# Patient Record
Sex: FEMALE | Race: WHITE | URBAN - METROPOLITAN AREA
[De-identification: names, ages, dates, MRNs, and addresses within clinical notes are randomized per-mention and may not be internally consistent; named-entity substitution may affect disease eponyms.]

---

## 2021-11-09 ENCOUNTER — OFFICE VISIT (OUTPATIENT)
Dept: URBAN - METROPOLITAN AREA CLINIC 98 | Facility: CLINIC | Age: 81
End: 2021-11-09
Payer: MEDICARE

## 2021-11-09 VITALS
TEMPERATURE: 97.3 F | WEIGHT: 197.8 LBS | DIASTOLIC BLOOD PRESSURE: 73 MMHG | HEIGHT: 67 IN | HEART RATE: 63 BPM | SYSTOLIC BLOOD PRESSURE: 126 MMHG | BODY MASS INDEX: 31.04 KG/M2

## 2021-11-09 DIAGNOSIS — Z79.01 ANTICOAGULANT LONG-TERM USE: ICD-10-CM

## 2021-11-09 DIAGNOSIS — Z12.11 COLON CANCER SCREENING: ICD-10-CM

## 2021-11-09 DIAGNOSIS — D12.6 ADENOMATOUS POLYP OF COLON, UNSPECIFIED PART OF COLON: ICD-10-CM

## 2021-11-09 PROCEDURE — G8427 DOCREV CUR MEDS BY ELIG CLIN: HCPCS | Performed by: INTERNAL MEDICINE

## 2021-11-09 PROCEDURE — 3017F COLORECTAL CA SCREEN DOC REV: CPT | Performed by: INTERNAL MEDICINE

## 2021-11-09 PROCEDURE — 99203 OFFICE O/P NEW LOW 30 MIN: CPT | Performed by: INTERNAL MEDICINE

## 2021-11-09 PROCEDURE — G9903 PT SCRN TBCO ID AS NON USER: HCPCS | Performed by: INTERNAL MEDICINE

## 2021-11-09 PROCEDURE — G8417 CALC BMI ABV UP PARAM F/U: HCPCS | Performed by: INTERNAL MEDICINE

## 2021-11-09 PROCEDURE — G8482 FLU IMMUNIZE ORDER/ADMIN: HCPCS | Performed by: INTERNAL MEDICINE

## 2021-11-09 RX ORDER — FLUOXETINE HYDROCHLORIDE 40 MG/1
CAPSULE ORAL
Qty: 0 | Refills: 0 | Status: ACTIVE | COMMUNITY
Start: 1900-01-01

## 2021-11-09 RX ORDER — FLECAINIDE ACETATE 50 MG/1
AS DIRECTED TABLET ORAL
Status: ACTIVE | COMMUNITY

## 2021-11-09 RX ORDER — METOPROLOL SUCCINATE 25 MG/1
1 TABLET TABLET, FILM COATED, EXTENDED RELEASE ORAL ONCE A DAY
Status: ACTIVE | COMMUNITY

## 2021-11-09 RX ORDER — SIMVASTATIN 40 MG/1
TABLET, FILM COATED ORAL
Qty: 0 | Refills: 0 | Status: ACTIVE | COMMUNITY
Start: 1900-01-01

## 2021-11-09 RX ORDER — METFORMIN HYDROCHLORIDE 850 MG/1
TABLET, COATED ORAL
Qty: 0 | Refills: 0 | Status: ON HOLD | COMMUNITY
Start: 1900-01-01

## 2021-11-09 NOTE — HPI-TODAY'S VISIT:
PMH A. Fib (on Coumadin), HLD, and depression, TA seen 2015 Here to discuss colonoscopy Last colonoscopy 2018 without adenomas. Colonoscopy 2015 with small TA removed  SSA removed 2013 HPs seen 2011 She has no change in bowel habits No blood in stool Does have FHx CRC in mother at age 53

## 2022-08-23 ENCOUNTER — LAB OUTSIDE AN ENCOUNTER (OUTPATIENT)
Dept: URBAN - METROPOLITAN AREA CLINIC 98 | Facility: CLINIC | Age: 82
End: 2022-08-23

## 2022-08-23 ENCOUNTER — OFFICE VISIT (OUTPATIENT)
Dept: URBAN - METROPOLITAN AREA CLINIC 98 | Facility: CLINIC | Age: 82
End: 2022-08-23
Payer: MEDICARE

## 2022-08-23 ENCOUNTER — WEB ENCOUNTER (OUTPATIENT)
Dept: URBAN - METROPOLITAN AREA CLINIC 98 | Facility: CLINIC | Age: 82
End: 2022-08-23

## 2022-08-23 VITALS
TEMPERATURE: 97.1 F | HEART RATE: 72 BPM | BODY MASS INDEX: 29.7 KG/M2 | DIASTOLIC BLOOD PRESSURE: 80 MMHG | SYSTOLIC BLOOD PRESSURE: 139 MMHG | HEIGHT: 67 IN | WEIGHT: 189.2 LBS

## 2022-08-23 DIAGNOSIS — D12.6 ADENOMATOUS POLYP OF COLON, UNSPECIFIED PART OF COLON: ICD-10-CM

## 2022-08-23 DIAGNOSIS — Z12.11 COLON CANCER SCREENING: ICD-10-CM

## 2022-08-23 DIAGNOSIS — R19.4 CHANGE IN BOWEL HABITS: ICD-10-CM

## 2022-08-23 DIAGNOSIS — Z79.01 ANTICOAGULANT LONG-TERM USE: ICD-10-CM

## 2022-08-23 PROCEDURE — G8427 DOCREV CUR MEDS BY ELIG CLIN: HCPCS | Performed by: INTERNAL MEDICINE

## 2022-08-23 PROCEDURE — G8482 FLU IMMUNIZE ORDER/ADMIN: HCPCS | Performed by: INTERNAL MEDICINE

## 2022-08-23 PROCEDURE — 99214 OFFICE O/P EST MOD 30 MIN: CPT | Performed by: INTERNAL MEDICINE

## 2022-08-23 PROCEDURE — G8420 CALC BMI NORM PARAMETERS: HCPCS | Performed by: INTERNAL MEDICINE

## 2022-08-23 PROCEDURE — G9903 PT SCRN TBCO ID AS NON USER: HCPCS | Performed by: INTERNAL MEDICINE

## 2022-08-23 PROCEDURE — 3017F COLORECTAL CA SCREEN DOC REV: CPT | Performed by: INTERNAL MEDICINE

## 2022-08-23 RX ORDER — METFORMIN HYDROCHLORIDE 850 MG/1
TABLET, COATED ORAL
Qty: 0 | Refills: 0 | Status: ON HOLD | COMMUNITY
Start: 1900-01-01

## 2022-08-23 RX ORDER — METOPROLOL SUCCINATE 25 MG/1
1 TABLET TABLET, FILM COATED, EXTENDED RELEASE ORAL ONCE A DAY
Status: ACTIVE | COMMUNITY

## 2022-08-23 RX ORDER — SIMVASTATIN 40 MG/1
TABLET, FILM COATED ORAL
Qty: 0 | Refills: 0 | Status: ACTIVE | COMMUNITY
Start: 1900-01-01

## 2022-08-23 RX ORDER — FLECAINIDE ACETATE 50 MG/1
AS DIRECTED TABLET ORAL
Status: ACTIVE | COMMUNITY

## 2022-08-23 RX ORDER — POLYETHYLENE GLYCOL-3350 AND ELECTROLYTES WITH FLAVOR PACK 240; 5.84; 2.98; 6.72; 22.72 G/278.26G; G/278.26G; G/278.26G; G/278.26G; G/278.26G
AS DIRECTED POWDER, FOR SOLUTION ORAL ONCE
Qty: 1 KIT | Refills: 0 | OUTPATIENT
Start: 2022-08-23 | End: 2022-08-24

## 2022-08-23 NOTE — HPI-TODAY'S VISIT:
f/u visit Over past 3mo she has had a change in bowel habits She will have a bowel movement, which is followed by diarrhea This occurs almost every day, 3x/day No blood or mucus in stools No abx use in the past year No sick contacts She did start Celexa about 3mo ago (thinks diarrhea began before starting Celexa) +ve tenesmus   . PRIOR VISIT: PMH A. Fib (on Coumadin), HLD, and depression, TA seen 2015 Here to discuss colonoscopy Last colonoscopy 2018 without adenomas. Colonoscopy 2015 with small TA removed  SSA removed 2013 HPs seen 2011 She has no change in bowel habits No blood in stool Does have FHx CRC in mother at age 53

## 2022-08-24 LAB
A/G RATIO: 1.6
ABSOLUTE BASOPHILS: 42
ABSOLUTE EOSINOPHILS: 231
ABSOLUTE LYMPHOCYTES: 2226
ABSOLUTE MONOCYTES: 623
ABSOLUTE NEUTROPHILS: 3878
ALBUMIN: 4.1
ALKALINE PHOSPHATASE: 69
ALT (SGPT): 22
AST (SGOT): 25
BASOPHILS: 0.6
BILIRUBIN, TOTAL: 0.5
BUN/CREATININE RATIO: (no result)
BUN: 19
C-REACTIVE PROTEIN, QUANT: 4.8
CALCIUM: 9.6
CARBON DIOXIDE, TOTAL: 26
CHLORIDE: 99
CREATININE: 0.8
EGFR: 74
EOSINOPHILS: 3.3
GLOBULIN, TOTAL: 2.6
GLUCOSE: 70
HEMATOCRIT: 36.8
HEMOGLOBIN: 11.9
LYMPHOCYTES: 31.8
MCH: 28.5
MCHC: 32.3
MCV: 88
MONOCYTES: 8.9
MPV: 10.8
NEUTROPHILS: 55.4
PLATELET COUNT: 199
POTASSIUM: 4.3
PROTEIN, TOTAL: 6.7
RDW: 16.4
RED BLOOD CELL COUNT: 4.18
SODIUM: 136
TSH W/REFLEX TO FT4: 2.99
WHITE BLOOD CELL COUNT: 7

## 2022-10-12 ENCOUNTER — TELEPHONE ENCOUNTER (OUTPATIENT)
Dept: URBAN - METROPOLITAN AREA CLINIC 98 | Facility: CLINIC | Age: 82
End: 2022-10-12

## 2022-10-14 ENCOUNTER — OFFICE VISIT (OUTPATIENT)
Dept: URBAN - METROPOLITAN AREA MEDICAL CENTER 28 | Facility: MEDICAL CENTER | Age: 82
End: 2022-10-14

## 2022-10-14 RX ORDER — FLECAINIDE ACETATE 50 MG/1
AS DIRECTED TABLET ORAL
Status: ACTIVE | COMMUNITY

## 2022-10-14 RX ORDER — METFORMIN HYDROCHLORIDE 850 MG/1
TABLET, COATED ORAL
Qty: 0 | Refills: 0 | Status: ON HOLD | COMMUNITY
Start: 1900-01-01

## 2022-10-14 RX ORDER — SIMVASTATIN 40 MG/1
TABLET, FILM COATED ORAL
Qty: 0 | Refills: 0 | Status: ACTIVE | COMMUNITY
Start: 1900-01-01

## 2022-10-14 RX ORDER — METOPROLOL SUCCINATE 25 MG/1
1 TABLET TABLET, FILM COATED, EXTENDED RELEASE ORAL ONCE A DAY
Status: ACTIVE | COMMUNITY

## 2023-02-21 ENCOUNTER — OFFICE VISIT (OUTPATIENT)
Dept: URBAN - METROPOLITAN AREA CLINIC 98 | Facility: CLINIC | Age: 83
End: 2023-02-21
Payer: MEDICARE

## 2023-02-21 ENCOUNTER — LAB OUTSIDE AN ENCOUNTER (OUTPATIENT)
Dept: URBAN - METROPOLITAN AREA CLINIC 98 | Facility: CLINIC | Age: 83
End: 2023-02-21

## 2023-02-21 ENCOUNTER — DASHBOARD ENCOUNTERS (OUTPATIENT)
Age: 83
End: 2023-02-21

## 2023-02-21 VITALS
HEART RATE: 69 BPM | DIASTOLIC BLOOD PRESSURE: 73 MMHG | WEIGHT: 196.5 LBS | SYSTOLIC BLOOD PRESSURE: 136 MMHG | BODY MASS INDEX: 30.84 KG/M2 | TEMPERATURE: 97.3 F | HEIGHT: 67 IN

## 2023-02-21 DIAGNOSIS — R19.4 CHANGE IN BOWEL HABITS: ICD-10-CM

## 2023-02-21 DIAGNOSIS — Z12.11 COLON CANCER SCREENING: ICD-10-CM

## 2023-02-21 DIAGNOSIS — D12.6 ADENOMATOUS POLYP OF COLON, UNSPECIFIED PART OF COLON: ICD-10-CM

## 2023-02-21 DIAGNOSIS — Z79.01 ANTICOAGULANT LONG-TERM USE: ICD-10-CM

## 2023-02-21 DIAGNOSIS — Z86.010 PERSONAL HISTORY OF COLONIC POLYPS: ICD-10-CM

## 2023-02-21 PROBLEM — 129851009: Status: ACTIVE | Noted: 2022-08-23

## 2023-02-21 PROBLEM — 711150003 LONG-TERM CURRENT USE OF ANTICOAGULANT: Status: ACTIVE | Noted: 2021-11-09

## 2023-02-21 PROBLEM — 428283002: Status: ACTIVE | Noted: 2023-02-21

## 2023-02-21 PROBLEM — 92065004 BENIGN NEOPLASM OF COLON: Status: ACTIVE | Noted: 2021-11-09

## 2023-02-21 PROCEDURE — 99214 OFFICE O/P EST MOD 30 MIN: CPT | Performed by: INTERNAL MEDICINE

## 2023-02-21 RX ORDER — FLECAINIDE ACETATE 50 MG/1
AS DIRECTED TABLET ORAL
Status: ACTIVE | COMMUNITY

## 2023-02-21 RX ORDER — SODIUM, POTASSIUM,MAG SULFATES 17.5-3.13G
354ML SOLUTION, RECONSTITUTED, ORAL ORAL ONCE
Qty: 354 MILLILITER | Refills: 0 | OUTPATIENT

## 2023-02-21 RX ORDER — METOPROLOL SUCCINATE 25 MG/1
1 TABLET TABLET, FILM COATED, EXTENDED RELEASE ORAL ONCE A DAY
Status: ACTIVE | COMMUNITY

## 2023-02-21 RX ORDER — SIMVASTATIN 40 MG/1
TABLET, FILM COATED ORAL
Qty: 0 | Refills: 0 | Status: ACTIVE | COMMUNITY
Start: 1900-01-01

## 2023-02-21 RX ORDER — METFORMIN HYDROCHLORIDE 850 MG/1
TABLET, COATED ORAL
Qty: 0 | Refills: 0 | Status: ON HOLD | COMMUNITY
Start: 1900-01-01

## 2023-02-21 NOTE — PHYSICAL EXAM HENT:
Head , normocephalic , atraumatic ,  , Head , normocephalic , atraumatic ,  , Head , normocephalic , atraumatic ,

## 2023-02-21 NOTE — HPI-TODAY'S VISIT:
f/u visit Continued alternative constipation and diarrhea Developed vulvar itching she saw GYN about She did not take benefiber discussed last visit We discussed PT but did not go to then She would like to have colonoscopy  . PRIOR VISIT: Over past 3mo she has had a change in bowel habits She will have a bowel movement, which is followed by diarrhea This occurs almost every day, 3x/day No blood or mucus in stools No abx use in the past year No sick contacts She did start Celexa about 3mo ago (thinks diarrhea began before starting Celexa) +ve tenesmus   . PRIOR VISIT: PMH A. Fib (on Coumadin), HLD, and depression, TA seen 2015 Here to discuss colonoscopy Last colonoscopy 2018 without adenomas. Colonoscopy 2015 with small TA removed  SSA removed 2013 HPs seen 2011 She has no change in bowel habits No blood in stool Does have FHx CRC in mother at age 53

## 2023-05-04 ENCOUNTER — OFFICE VISIT (OUTPATIENT)
Dept: URBAN - METROPOLITAN AREA SURGERY CENTER 18 | Facility: SURGERY CENTER | Age: 83
End: 2023-05-04

## 2023-05-12 ENCOUNTER — OFFICE VISIT (OUTPATIENT)
Dept: URBAN - METROPOLITAN AREA MEDICAL CENTER 28 | Facility: MEDICAL CENTER | Age: 83
End: 2023-05-12